# Patient Record
Sex: FEMALE | Race: ASIAN | Employment: FULL TIME | URBAN - METROPOLITAN AREA
[De-identification: names, ages, dates, MRNs, and addresses within clinical notes are randomized per-mention and may not be internally consistent; named-entity substitution may affect disease eponyms.]

---

## 2021-08-24 ENCOUNTER — OFFICE VISIT (OUTPATIENT)
Dept: URGENT CARE | Facility: URGENT CARE | Age: 26
End: 2021-08-24
Payer: COMMERCIAL

## 2021-08-24 VITALS
SYSTOLIC BLOOD PRESSURE: 133 MMHG | WEIGHT: 148 LBS | TEMPERATURE: 98.9 F | HEART RATE: 98 BPM | RESPIRATION RATE: 20 BRPM | DIASTOLIC BLOOD PRESSURE: 86 MMHG | OXYGEN SATURATION: 98 %

## 2021-08-24 DIAGNOSIS — S01.01XA LACERATION OF SCALP, INITIAL ENCOUNTER: Primary | ICD-10-CM

## 2021-08-24 PROCEDURE — 12001 RPR S/N/AX/GEN/TRNK 2.5CM/<: CPT | Performed by: NURSE PRACTITIONER

## 2021-08-24 ASSESSMENT — ENCOUNTER SYMPTOMS
DIARRHEA: 0
ROS SKIN COMMENTS: SCALP LACERATION
COUGH: 0
FEVER: 0
SHORTNESS OF BREATH: 0
HEADACHES: 0
SORE THROAT: 0
NAUSEA: 0
RHINORRHEA: 0
VOMITING: 0
CHILLS: 0
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
## 2021-08-24 NOTE — PATIENT INSTRUCTIONS
Patient Education     Laceration, Skin Adhesive  A laceration is a cut through the skin. You have a laceration that your healthcare provider has closed with skin adhesive, a type of skin glue.  Home care  You may take over-the-counter medicine such as acetaminophen, naproxen, or ibuprofen for pain, unless your provider prescribed another pain medicine. Talk with your healthcare provider before using these medicines if you have chronic liver or kidney disease or ever had a stomach ulcer or gastrointestinal bleeding.  General care    Keep the wound clean and dry. You may shower or bathe as usual, but don't use soaps, lotions, or ointments on the wound area. Don't scrub the wound. After bathing, pat the wound dry with a soft towel.    Don't scratch, rub, or pick at the adhesive film. Don't place tape directly over the film.    Don't apply liquids such as peroxide, ointments, or creams to the wound while the film is in place. These may dissolve the adhesive too soon.    Most skin wounds heal without problems. However, an infection sometimes occurs despite correct treatment. Watch for the signs of infection listed below.    Follow-up care  Follow up with your healthcare provider, or as advised. The adhesive film or skin glue usually falls off in 5 to 10 days.  When to seek medical advice  Call your healthcare provider right away if any of these occur:    Signs of infection:  ? Fever of 100.4 F (38 C) or higher, or as advised by your healthcare provider  ? Increasing pain in the wound  ? Increasing redness or swelling  ? Pus coming from the wound    Wound bleeds more than a small amount or bleeding doesn t stop    Glue comes off earlier than expected and the wound edges come apart    You feel numbness or weakness in the wound area that doesn t go away  Polo last reviewed this educational content on 8/1/2019 2000-2021 [employer]. All rights reserved. This information is not intended as a substitute  for professional medical care. Always follow your healthcare professional's instructions.

## 2022-11-28 ENCOUNTER — TRANSFERRED RECORDS (OUTPATIENT)
Dept: HEALTH INFORMATION MANAGEMENT | Facility: CLINIC | Age: 27
End: 2022-11-28

## 2022-11-28 LAB
ALT SERPL-CCNC: 32 IU/L (ref 0–32)
AST SERPL-CCNC: 25 IU/L (ref 0–40)
CHOLESTEROL (EXTERNAL): 185 MG/DL (ref 100–199)
CREATININE (EXTERNAL): 0.52 MG/DL (ref 0.57–1)
GFR ESTIMATED (EXTERNAL): 131 ML/MIN/1.73M2
GLUCOSE (EXTERNAL): 90 MG/DL (ref 70–99)
HBA1C MFR BLD: 5.3 % (ref 4.8–5.6)
HDLC SERPL-MCNC: 46 MG/DL
LDL CHOLESTEROL (EXTERNAL): 115 MG/DL (ref 0–99)
POTASSIUM (EXTERNAL): 4.3 MMOL/L (ref 3.5–5.2)
TRIGLYCERIDES (EXTERNAL): 132 MG/DL (ref 0–149)
TSH SERPL-ACNC: 2.32 UIU/ML (ref 0.45–4.5)

## 2022-12-06 ENCOUNTER — TRANSFERRED RECORDS (OUTPATIENT)
Dept: HEALTH INFORMATION MANAGEMENT | Facility: CLINIC | Age: 27
End: 2022-12-06

## 2022-12-06 ENCOUNTER — MEDICAL CORRESPONDENCE (OUTPATIENT)
Dept: HEALTH INFORMATION MANAGEMENT | Facility: CLINIC | Age: 27
End: 2022-12-06

## 2022-12-07 ENCOUNTER — TRANSCRIBE ORDERS (OUTPATIENT)
Dept: OTHER | Age: 27
End: 2022-12-07

## 2022-12-07 DIAGNOSIS — Z31.84 ENCOUNTER FOR FERTILITY PRESERVATION PROCEDURE: Primary | ICD-10-CM

## 2023-01-04 ENCOUNTER — LAB (OUTPATIENT)
Dept: LAB | Facility: CLINIC | Age: 28
End: 2023-01-04
Attending: PSYCHOLOGIST
Payer: COMMERCIAL

## 2023-01-04 ENCOUNTER — OFFICE VISIT (OUTPATIENT)
Dept: OBGYN | Facility: CLINIC | Age: 28
End: 2023-01-04
Attending: PSYCHOLOGIST
Payer: COMMERCIAL

## 2023-01-04 VITALS — WEIGHT: 154.8 LBS | HEART RATE: 84 BPM | SYSTOLIC BLOOD PRESSURE: 125 MMHG | DIASTOLIC BLOOD PRESSURE: 86 MMHG

## 2023-01-04 DIAGNOSIS — N97.9 FEMALE INFERTILITY: ICD-10-CM

## 2023-01-04 DIAGNOSIS — N97.9 FEMALE INFERTILITY: Primary | ICD-10-CM

## 2023-01-04 LAB
ESTRADIOL SERPL-MCNC: 28 PG/ML
FSH SERPL-ACNC: 5.4 IU/L
MIS SERPL-MCNC: 4.67 NG/ML (ref 0.89–9.9)
PROGEST SERPL-MCNC: 0.4 NG/ML

## 2023-01-04 PROCEDURE — 82670 ASSAY OF TOTAL ESTRADIOL: CPT

## 2023-01-04 PROCEDURE — 82627 DEHYDROEPIANDROSTERONE: CPT

## 2023-01-04 PROCEDURE — G0463 HOSPITAL OUTPT CLINIC VISIT: HCPCS | Performed by: STUDENT IN AN ORGANIZED HEALTH CARE EDUCATION/TRAINING PROGRAM

## 2023-01-04 PROCEDURE — 84403 ASSAY OF TOTAL TESTOSTERONE: CPT

## 2023-01-04 PROCEDURE — 99203 OFFICE O/P NEW LOW 30 MIN: CPT | Mod: GE | Performed by: OBSTETRICS & GYNECOLOGY

## 2023-01-04 PROCEDURE — 83520 IMMUNOASSAY QUANT NOS NONAB: CPT

## 2023-01-04 PROCEDURE — 36415 COLL VENOUS BLD VENIPUNCTURE: CPT

## 2023-01-04 PROCEDURE — 84144 ASSAY OF PROGESTERONE: CPT

## 2023-01-04 PROCEDURE — 83001 ASSAY OF GONADOTROPIN (FSH): CPT

## 2023-01-04 ASSESSMENT — ANXIETY QUESTIONNAIRES
2. NOT BEING ABLE TO STOP OR CONTROL WORRYING: NOT AT ALL
GAD7 TOTAL SCORE: 0
5. BEING SO RESTLESS THAT IT IS HARD TO SIT STILL: NOT AT ALL
1. FEELING NERVOUS, ANXIOUS, OR ON EDGE: NOT AT ALL
GAD7 TOTAL SCORE: 0
3. WORRYING TOO MUCH ABOUT DIFFERENT THINGS: NOT AT ALL
6. BECOMING EASILY ANNOYED OR IRRITABLE: NOT AT ALL
7. FEELING AFRAID AS IF SOMETHING AWFUL MIGHT HAPPEN: NOT AT ALL

## 2023-01-04 ASSESSMENT — PATIENT HEALTH QUESTIONNAIRE - PHQ9
5. POOR APPETITE OR OVEREATING: NOT AT ALL
SUM OF ALL RESPONSES TO PHQ QUESTIONS 1-9: 4

## 2023-01-04 NOTE — PROGRESS NOTES
[facility]  Gynecology Visit      HPI:    Kaleigh Cannon is a 27 year old G0, here for evaluation of infertility. She presents with her partner, Fabrice.    Patient states she and her  have been trying to get pregnant for the past 2 years. She states they have been having intercourse three times per week. They have never used ovulation test kits. Patient states she has very irregular periods, she gets periods every 6 months or so, last maybe 2 months ago. When she gets periods, they are heavy. She states her periods have been like this also for the past 2 years. Her periods prior to that occurred approximately ever 2-3 months. Her periods are not painful. She was evaluated in the past for PCOS, but does not remember specific labs and has never had a transvaginal ultrasound performed. She denies any pregnancies. Her  has no children. She denies any concerns for STIs, has never had an STI. She has never used birth control in the past.     OBHx  OB History    Para Term  AB Living   0 0 0 0 0 0   SAB IAB Ectopic Multiple Live Births   0 0 0 0 0       Past Medical History:   Diagnosis Date     Known health problems: none        Past Surgical History:   Procedure Laterality Date     NO HISTORY OF SURGERY         No current outpatient medications on file.     No current facility-administered medications for this visit.        No Known Allergies      Social History     Tobacco Use     Smoking status: Never     Smokeless tobacco: Never   Substance Use Topics     Alcohol use: Never     Drug use: Never         No family history on file.      ROS: 10-Point ROS negative except as noted in HPI    Physical Exam  /86   Pulse 84   Wt 70.2 kg (154 lb 12.8 oz)   LMP 04/10/2022   Gen: Well-appearing, NAD  HEENT: Normocephalic, atraumatic  CV:  Well perfused  Pulm: On room air, no increased work of breathing  Abd: Soft, non-tender, non-distended  Ext: No LE edema, extremities warm    Pelvic exam:  Deferred    Assessment/Plan:  [facility] [place] Cannon is a 27 year old G0 female here for evaluation of infertility with concern for PCOS    #Possible PCOS  #Infertility  - Since patient has not had lab or imaging evaluation for PCOS with history concerning for PCOS, will order this workup today. Discussed possible outcomes of evaluating for PCOS and infertility which may include the ability to give ovulation medications from this office, or fertility may require GUERDA management. Will observe results and discuss future options.   - Ordered random labs to be collected today, patient has not had a period in at least 2 months. E2, FSH, AMH, DHEA-S, testosterone, progesterone.   - TVUS  - Will complete pelvic exam at follow up visit  - Will have in person follow up visit after ultrasound to discuss next steps.     Staffed with .      Hardy Parrish MD  Obstetrics, Gynecology, and Women's Health  PGY3  7:41 PM 01/04/2023    The Patient was seen in [facility] by HARDY PARRISH.  I reviewed the history & exam. Assessment and plan were jointly made.    Griselda Cassidy MD

## 2023-01-05 LAB
DHEA-S SERPL-MCNC: 426 UG/DL (ref 35–430)
TESTOST SERPL-MCNC: 47 NG/DL (ref 8–60)

## 2023-05-20 ENCOUNTER — HEALTH MAINTENANCE LETTER (OUTPATIENT)
Age: 28
End: 2023-05-20

## 2024-07-02 ENCOUNTER — MEDICAL CORRESPONDENCE (OUTPATIENT)
Dept: HEALTH INFORMATION MANAGEMENT | Facility: CLINIC | Age: 29
End: 2024-07-02
Payer: MEDICAID

## 2024-07-27 ENCOUNTER — HEALTH MAINTENANCE LETTER (OUTPATIENT)
Age: 29
End: 2024-07-27

## 2024-08-20 NOTE — PATIENT INSTRUCTIONS
If you have labs or imaging done, the results will automatically release in [employer] without an interpretation.  Your health care professional will review those results and send an interpretation with recommendations as soon as possible, but this may be 1-3 business days.    If you have any questions regarding your visit, please contact your care team.     [employer] [facility]: [phone]  [facility] CLINIC HOURS TELEPHONE NUMBER   Todd BALTAZAR Chadd .      Amber-GALINA Arias-GALINA Greene-Surgery Scheduler  Jolie-         Monday-[place]  8:00 am-4:00 pm  [facility]  [facility]4:00 pm  Wednesday-[place] 8:00 am-4:00 pm  Thursday-[place] 8:00 am-4:00 pm    [facility]ical [facility]  [address] [place] [address]  PH: [phone] Fax    Imaging Scheduling all locations  PH: [phone]     [facility]  [address]  [place], [place] [address] [phone]    [facility]  [address] [place] [address]  PH: [phone]     **Surgeries** Our Surgery Schedulers will contact you to schedule. If you do not receive a call within 3 business days, please call [phone].  Urgent Care locations:  [facility]       Monday-Friday   10 am - 8 pm  Saturday and Sunday   9 am - 5 pm   [phone]   If you need a medication refill, please contact your pharmacy. Please allow 3 business days for your refill to be completed.  As always, Thank you for trusting us with your healthcare needs!

## 2024-08-21 ENCOUNTER — OFFICE VISIT (OUTPATIENT)
Dept: OBGYN | Facility: CLINIC | Age: 29
End: 2024-08-21
Payer: MEDICAID

## 2024-08-21 VITALS
OXYGEN SATURATION: 97 % | HEART RATE: 104 BPM | WEIGHT: 149.6 LBS | DIASTOLIC BLOOD PRESSURE: 85 MMHG | SYSTOLIC BLOOD PRESSURE: 133 MMHG

## 2024-08-21 DIAGNOSIS — N93.9 ABNORMAL UTERINE BLEEDING (AUB): ICD-10-CM

## 2024-08-21 DIAGNOSIS — Z12.4 CERVICAL CANCER SCREENING: ICD-10-CM

## 2024-08-21 DIAGNOSIS — D25.9 UTERINE LEIOMYOMA, UNSPECIFIED LOCATION: ICD-10-CM

## 2024-08-21 DIAGNOSIS — N89.8 VAGINAL DISCHARGE: Primary | ICD-10-CM

## 2024-08-21 LAB
CLUE CELLS: ABNORMAL
ERYTHROCYTE [DISTWIDTH] IN BLOOD BY AUTOMATED COUNT: 11.9 % (ref 10–15)
HCT VFR BLD AUTO: 44.1 % (ref 35–47)
HGB BLD-MCNC: 14.5 G/DL (ref 11.7–15.7)
MCH RBC QN AUTO: 28 PG (ref 26.5–33)
MCHC RBC AUTO-ENTMCNC: 32.9 G/DL (ref 31.5–36.5)
MCV RBC AUTO: 85 FL (ref 78–100)
PLATELET # BLD AUTO: 278 10E3/UL (ref 150–450)
PROLACTIN SERPL 3RD IS-MCNC: 23 NG/ML (ref 5–23)
RBC # BLD AUTO: 5.18 10E6/UL (ref 3.8–5.2)
TRICHOMONAS, WET PREP: ABNORMAL
TSH SERPL DL<=0.005 MIU/L-ACNC: 2.22 UIU/ML (ref 0.3–4.2)
WBC # BLD AUTO: 6.3 10E3/UL (ref 4–11)
WBC'S/HIGH POWER FIELD, WET PREP: ABNORMAL
YEAST, WET PREP: ABNORMAL

## 2024-08-21 PROCEDURE — G0145 SCR C/V CYTO,THINLAYER,RESCR: HCPCS | Performed by: GENERAL PRACTICE

## 2024-08-21 PROCEDURE — 84443 ASSAY THYROID STIM HORMONE: CPT | Performed by: GENERAL PRACTICE

## 2024-08-21 PROCEDURE — 36415 COLL VENOUS BLD VENIPUNCTURE: CPT | Performed by: GENERAL PRACTICE

## 2024-08-21 PROCEDURE — 87210 SMEAR WET MOUNT SALINE/INK: CPT | Performed by: GENERAL PRACTICE

## 2024-08-21 PROCEDURE — 84146 ASSAY OF PROLACTIN: CPT | Performed by: GENERAL PRACTICE

## 2024-08-21 PROCEDURE — 87591 N.GONORRHOEAE DNA AMP PROB: CPT | Performed by: GENERAL PRACTICE

## 2024-08-21 PROCEDURE — 87624 HPV HI-RISK TYP POOLED RSLT: CPT | Performed by: GENERAL PRACTICE

## 2024-08-21 PROCEDURE — 99459 PELVIC EXAMINATION: CPT | Performed by: GENERAL PRACTICE

## 2024-08-21 PROCEDURE — T1013 SIGN LANG/ORAL INTERPRETER: HCPCS | Mod: U4 | Performed by: GENERAL PRACTICE

## 2024-08-21 PROCEDURE — 87491 CHLMYD TRACH DNA AMP PROBE: CPT | Performed by: GENERAL PRACTICE

## 2024-08-21 PROCEDURE — 85027 COMPLETE CBC AUTOMATED: CPT | Performed by: GENERAL PRACTICE

## 2024-08-21 PROCEDURE — 99214 OFFICE O/P EST MOD 30 MIN: CPT | Performed by: GENERAL PRACTICE

## 2024-08-21 PROCEDURE — G0124 SCREEN C/V THIN LAYER BY MD: HCPCS | Performed by: PATHOLOGY

## 2024-08-21 RX ORDER — FAMOTIDINE 20 MG
TABLET ORAL
COMMUNITY

## 2024-08-21 NOTE — PROGRESS NOTES
"HPI:   Kaleigh is a 28 year old  here for abnormal vaginal discharge. States discharge is stringy, somewhat red. Started about 2-3 months ago. Discharge comes and goes. Not associated menstrual cycle or intercourse. Denies vaginal burning. Has some vaginal itching. Also was back in [place] in 2024 and told she had fibroids. Her menses are very irregular every 1-3 months. Lasting up to 5+ days with several heavy days.      Also patient states she tested positive for HPV and was told to see a GYN doctor. No records of this test or clinic visit available at this time and patient does not have records.       She is currently using nothing for birth control.  Not currently sexually active.       ROS:   Weight loss or gain: denies  Abdominal bloating / pain: denies  Appetite: normal  Energy: normal  Nausea / vomiting: denies  Fevers / chills / night sweats: denies  SOB / cough: denies  Chest pain / palpitations: denies  Diarrhea / constipation: denies  Bloody / tar-colored stools: denies  Urinary frequency / urgency / blood: denies  Headaches / vision changes: denies  Mouth sores: denies  Skin changes / rashes: denies      GYNHx:   Patient's last menstrual period was 2024.  Cycles: as above  Last paps:  No results found for: \"PAP\"      OBHx:  OB History    Para Term  AB Living   0 0 0 0 0 0   SAB IAB Ectopic Multiple Live Births   0 0 0 0 0        PSH:   Past Surgical History:   Procedure Laterality Date    NO HISTORY OF SURGERY         PMH:  Past Medical History:   Diagnosis Date    Known health problems: none         MEDs   Current Outpatient Medications   Medication Sig Dispense Refill    Vitamin D, Cholecalciferol, 25 MCG (1000 UT) CAPS Take by mouth. Unsure daily mg dose       No current facility-administered medications for this visit.       Allergies:  No Known Allergies    FamHx:  No family history on file.    SocHx:  Social History     Socioeconomic History    Marital status:  "     Spouse name: Not on file    Number of children: Not on file    Years of education: Not on file    Highest education level: Not on file   Occupational History    Not on file   Tobacco Use    Smoking status: Never    Smokeless tobacco: Never   Vaping Use    Vaping status: Never Used   Substance and Sexual Activity    Alcohol use: Never    Drug use: Never    Sexual activity: Yes     Partners: Male   Other Topics Concern    Not on file   Social History Narrative    Not on file     Social Determinants of Health     Financial Resource Strain: Not on file   Food Insecurity: Not on file   Transportation Needs: Not on file   Physical Activity: Not on file   Stress: Not on file   Social Connections: Not on file   Interpersonal Safety: Not on file   Housing Stability: Not on file             PE:   /85   Pulse 104   Wt 67.9 kg (149 lb 9.6 oz)   LMP 08/09/2024   SpO2 97%   There is no height or weight on file to calculate BMI.    General Appearance:  healthy, alert, active, no distress  HEENT: NC/AT, supple, trachea midline, no goiter  CV: well perfused  Lungs: non-labored breathing  Breast: not performed  Neuro: normal gait, balance  Skin: no lesions, visible rashes/bruising  Psych: appropriate mood/affect  Abdomen: Benign, No masses, organomegaly, and Soft, non-tender.   Pelvic:       - Ext: Vulva and perineum are normal without lesion, mass or discharge        - Urethra: normal without discharge or scarring        - Bladder: No tenderness,       - Vagina: Normal vaginal mucosa with no masses or lesions.  White stringy non-malodorous discharge noted. wet prep collected       - Cervix: Normal appearing cervix with no masses or lesions.  GC/CT, Pap and HPV collected       - Uterus:Normal shape, position and consistency       - Adnexa: Normal without masses or tenderness       - Rectal: deferred      RADS  None for review       LABS  None for review         A/P: 28-year-old G0 female here for vaginal discharge.   Review of systems also notable for abnormal uterine bleeding and fibroid uterus.  Patient reports positive HPV however no records are available and she is unable to tell us through  were her testing was done.  She has also had some testing done in [place] and those records are also not available at this time.  Exam today was overall unremarkable other than mildly friable cervix.  Gonorrhea, chlamydia, wet prep and Pap/HPV were collected.  Recommend patient have pelvic ultrasound completed and assistance was given and scheduling this.  Will have patient follow-up in clinic in 2 to 3 weeks after ultrasound is resulted.  Also will obtain TSH, prolactin and CBC due to her abnormal bleeding.  Will discuss further management options for AUB but hormonal control is likely best option as she is not currently trying to become pregnant.  All questions answered.  Patient agreement plan of care.  A Egyptian  was used for the entire duration of this visit.    ICD-10-CM    1. Vaginal discharge  N89.8 Wet prep - Clinic Collect     Chlamydia & Gonorrhea by PCR, [facility] - Clinic Collect      2. Cervical cancer screening  Z12.4 Pap Screen Reflex to HPV if ASCUS - Recommended Age 25 - 29 Years      3. Abnormal uterine bleeding (AUB)  N93.9 Chlamydia & Gonorrhea by PCR, [facility] - Clinic Collect     US Pelvic Complete with Transvaginal                35 min spent on the date of the encounter in chart review, patient visit, review of tests, documentation and/or discussion with other providers about the issues documented above.     Todd Florentino DO, FACOG

## 2024-08-22 LAB
C TRACH DNA SPEC QL PROBE+SIG AMP: NEGATIVE
N GONORRHOEA DNA SPEC QL NAA+PROBE: NEGATIVE

## 2024-08-27 ENCOUNTER — ANCILLARY PROCEDURE (OUTPATIENT)
Dept: ULTRASOUND IMAGING | Facility: CLINIC | Age: 29
End: 2024-08-27
Attending: GENERAL PRACTICE
Payer: MEDICAID

## 2024-08-27 DIAGNOSIS — N93.9 ABNORMAL UTERINE BLEEDING (AUB): ICD-10-CM

## 2024-08-27 PROCEDURE — 76830 TRANSVAGINAL US NON-OB: CPT | Mod: TC | Performed by: RADIOLOGY

## 2024-08-27 PROCEDURE — 76856 US EXAM PELVIC COMPLETE: CPT | Mod: TC | Performed by: RADIOLOGY

## 2024-08-28 LAB
BKR LAB AP GYN ADEQUACY: ABNORMAL
BKR LAB AP GYN INTERPRETATION: ABNORMAL
BKR LAB AP HPV REFLEX: ABNORMAL
BKR LAB AP PREVIOUS ABNL DX: ABNORMAL
BKR LAB AP PREVIOUS ABNORMAL: ABNORMAL
PATH REPORT.COMMENTS IMP SPEC: ABNORMAL
PATH REPORT.COMMENTS IMP SPEC: ABNORMAL
PATH REPORT.RELEVANT HX SPEC: ABNORMAL

## 2024-08-29 PROBLEM — R87.810 ASCUS WITH POSITIVE HIGH RISK HPV CERVICAL: Status: ACTIVE | Noted: 2024-08-21

## 2024-08-29 PROBLEM — R87.610 ASCUS WITH POSITIVE HIGH RISK HPV CERVICAL: Status: ACTIVE | Noted: 2024-08-21

## 2024-08-29 LAB
HPV HR 12 DNA CVX QL NAA+PROBE: POSITIVE
HPV16 DNA CVX QL NAA+PROBE: NEGATIVE
HPV18 DNA CVX QL NAA+PROBE: NEGATIVE
HUMAN PAPILLOMA VIRUS FINAL DIAGNOSIS: ABNORMAL

## 2024-08-30 ENCOUNTER — PATIENT OUTREACH (OUTPATIENT)
Dept: FAMILY MEDICINE | Facility: CLINIC | Age: 29
End: 2024-08-30
Payer: MEDICAID

## 2024-09-17 NOTE — PROGRESS NOTES
*Entire visit conducted with [employer] services.      28 year old  presents for colposcopy.    Indication for procedure: ASCUS / HPV+ HR other  Prior history of cervical dysplasia: No  Prior Colposcopy history: No  Prior LEEP:No    Patient's last menstrual period was 2024.    Tobacco: No  Gardasil vaccination status: Yes  Pregnancy test: Negative  She denies the possibility of pregnancy     Discussed nature of HPV related infection, natural history and association with cervical dysplasia.  Procedure for colposcopy and biopsy was explained to the patient and consent obtained.  All the patient's questions were answered.    PROCEDURE:  COLPOSCOPY  After a procedural timeout was taken, she was positioned in dorsal lithotomy and a speculum was inserted to allow visualization of the cervix. A 5% acetic acid solution was applied to the ectocervix with large swabs. Lugols solution was also applied.  Colposcopic examination was then undertaken as noted below.    FINDINGS:  Physical Exam  BP (!) 131/94   Pulse 95   LMP 2024   SpO2 98%   WDWN, NAD  Non labored breathing  Well perfused    Procedures  Cervix: no visible lesions, no mosaicism, no punctation, and no abnormal vasculature; acetowhite changes noted circumferentially around transition zone.  Cervical biopsies taken at 6 and 12:00.  ECC was performed.  Hemostasis achieved with silver nitrate.  .    Vaginal inspection: vaginal colposcopy not performed and normal without visible lesions.    Vulvar colposcopy: vulvar colposcopy not performed and normal mucosa without lesions.    Procedure Summary: Patient tolerated procedure well.    Colposcopic Impression: Normal-CIN1        I conclusion of procedure, patient desired to discuss ultrasound results as well as menstrual cycles.  She recently had an ultrasound with noting 3 fibroids largest of which is 5 cm SUBSEROSAL.  She is having cycles every 1 to 4 months and desired pregnancy.  She  denies significantly heavy or painful periods.  I discussed with patient that I would recommend she schedule follow-up visit to discuss fertility.  All questions answered.  Patient agreement with plan of care.    15 minutes were spent in face to face discussion regarding her abnormal pap, and fertility concerns separate from the procedure.     Plan: Specimens labelled and sent to pathology. and Will base further treatment on pathology findings.  Return to clinic for infertility consultation.      Todd Florentino DO, FACOG

## 2024-09-17 NOTE — PATIENT INSTRUCTIONS
If you have labs or imaging done, the results will automatically release in [employer] without an interpretation.  Your health care professional will review those results and send an interpretation with recommendations as soon as possible, but this may be 1-3 business days.    If you have any questions regarding your visit, please contact your care team.     [employer] [facility]: [phone]  [facility] CLINIC HOURS TELEPHONE NUMBER   Todd BALTAZAR Chadd .      Amber-GALINA Arias-GALINA Greene-Surgery Scheduler  Jolie-         Monday-[place]  8:00 am-4:00 pm  [facility]00 [facility]  Wednesday-[place] 8:00 am-4:00 pm  Thursday-[place] 8:00 am-4:00 pm    Typical Surgery Day Friday [facility]  [address] [place] [address]  PH: [phone] Fax    Imaging Scheduling all locations  PH: [phone]     [facility] Labor and Delivery  [address]  [place], [place] [address] [phone]    [facility]  [address] [place] [address]  PH: [phone]     **Surgeries** Our Surgery Schedulers will contact you to schedule. If you do not receive a call within 3 business days, please call [phone].  Urgent Care locations:  [facility]       Monday-Friday   10 am - 8 pm  Saturday and Sunday   9 am - 5 pm   [phone]   If you need a medication refill, please contact your pharmacy. Please allow 3 business days for your refill to be completed.  As always, Thank you for trusting us with your healthcare needs!

## 2024-09-18 ENCOUNTER — OFFICE VISIT (OUTPATIENT)
Dept: OBGYN | Facility: CLINIC | Age: 29
End: 2024-09-18
Payer: MEDICAID

## 2024-09-18 VITALS — SYSTOLIC BLOOD PRESSURE: 131 MMHG | HEART RATE: 95 BPM | DIASTOLIC BLOOD PRESSURE: 94 MMHG | OXYGEN SATURATION: 98 %

## 2024-09-18 DIAGNOSIS — R87.610 ASCUS WITH POSITIVE HIGH RISK HPV CERVICAL: Primary | ICD-10-CM

## 2024-09-18 DIAGNOSIS — Z32.00 ENCOUNTER FOR PREGNANCY TEST, RESULT UNKNOWN: ICD-10-CM

## 2024-09-18 DIAGNOSIS — N92.6 IRREGULAR MENSTRUAL CYCLE: ICD-10-CM

## 2024-09-18 DIAGNOSIS — R87.810 ASCUS WITH POSITIVE HIGH RISK HPV CERVICAL: Primary | ICD-10-CM

## 2024-09-18 DIAGNOSIS — N97.9 FEMALE INFERTILITY: ICD-10-CM

## 2024-09-18 DIAGNOSIS — D25.2 SUBSEROUS LEIOMYOMA OF UTERUS: ICD-10-CM

## 2024-09-18 LAB — HCG UR QL: NEGATIVE

## 2024-09-18 PROCEDURE — 57454 BX/CURETT OF CERVIX W/SCOPE: CPT | Performed by: GENERAL PRACTICE

## 2024-09-18 PROCEDURE — 88305 TISSUE EXAM BY PATHOLOGIST: CPT | Performed by: STUDENT IN AN ORGANIZED HEALTH CARE EDUCATION/TRAINING PROGRAM

## 2024-09-18 PROCEDURE — 81025 URINE PREGNANCY TEST: CPT | Performed by: GENERAL PRACTICE

## 2024-09-18 PROCEDURE — 99212 OFFICE O/P EST SF 10 MIN: CPT | Mod: 25 | Performed by: GENERAL PRACTICE

## 2024-09-18 RX ORDER — AMOXICILLIN 500 MG
1200 CAPSULE ORAL DAILY
COMMUNITY

## 2024-09-20 LAB
PATH REPORT.COMMENTS IMP SPEC: NORMAL
PATH REPORT.COMMENTS IMP SPEC: NORMAL
PATH REPORT.FINAL DX SPEC: NORMAL
PATH REPORT.GROSS SPEC: NORMAL
PATH REPORT.MICROSCOPIC SPEC OTHER STN: NORMAL
PATH REPORT.RELEVANT HX SPEC: NORMAL
PHOTO IMAGE: NORMAL

## 2024-10-08 NOTE — PATIENT INSTRUCTIONS
If you have labs or imaging done, the results will automatically release in [employer] without an interpretation.  Your health care professional will review those results and send an interpretation with recommendations as soon as possible, but this may be 1-3 business days.    If you have any questions regarding your visit, please contact your care team.     [employer] [facility]: [phone]  [facility] CLINIC HOURS TELEPHONE NUMBER   Todd BALTAZAR Chadd .      Amber-GALINA Arias-GALINA Greene-Surgery Scheduler  Jolie-         Monday-[place]  8:00 am-4:00 pm  [facility]  [facility] am-4:00 pm  Wednesday-[place] 8:00 am-4:00 pm  Thursday-[place] 8:00 am-4:00 pm    Typical Surgery Day Friday [facility]  [address] [place] [address]  PH: [phone] Fax    Imaging Scheduling all locations  PH: [phone]     [facility] Labor [facility] Delivery  [address]  [place], [place] [address] [phone]    [facility]  [address] [place] [address]  PH: [phone]     **Surgeries** Our Surgery Schedulers will contact you to schedule. If you do not receive a call within 3 business days, please call [phone].  Urgent Care locations:  [facility]       Monday-Friday   10 am - 8 pm  Saturday and Sunday   9 am - 5 pm   [phone]   If you need a medication refill, please contact your pharmacy. Please allow 3 business days for your refill to be completed.  As always, Thank you for trusting us with your healthcare needs!

## 2024-10-09 ENCOUNTER — OFFICE VISIT (OUTPATIENT)
Dept: OBGYN | Facility: CLINIC | Age: 29
End: 2024-10-09
Payer: COMMERCIAL

## 2024-10-09 VITALS — DIASTOLIC BLOOD PRESSURE: 85 MMHG | WEIGHT: 150.6 LBS | HEART RATE: 88 BPM | SYSTOLIC BLOOD PRESSURE: 128 MMHG

## 2024-10-09 DIAGNOSIS — N87.0 CERVICAL DYSPLASIA, MILD: Primary | ICD-10-CM

## 2024-10-09 PROCEDURE — 99213 OFFICE O/P EST LOW 20 MIN: CPT | Performed by: GENERAL PRACTICE

## 2024-10-09 PROCEDURE — T1013 SIGN LANG/ORAL INTERPRETER: HCPCS | Mod: U3

## 2024-10-09 NOTE — PROGRESS NOTES
*Hungarian  used for entire visit*    HPI:   Kaleigh is a 28 year old  here for colpo follow up.  She wanted to further discuss her pathology results and plan of care.  Of note she also notes that she has had some increased whitish discharge over the last couple months follow-up.  Wet prep in 2024 was normal.  When she has this discharge she denies having any vaginal itching, burning, discomfort.  No other issues or concerns at this time.        ROS:   10 point review of systems negative except for above.      GYNHx:   Patient's last menstrual period was 10/06/2024.    OBHx:  OB History    Para Term  AB Living   0 0 0 0 0 0   SAB IAB Ectopic Multiple Live Births   0 0 0 0 0        PSH:   Past Surgical History:   Procedure Laterality Date    NO HISTORY OF SURGERY         PMH:  Past Medical History:   Diagnosis Date    Known health problems: none         MEDs   Current Outpatient Medications   Medication Sig Dispense Refill    NONFORMULARY Vitamin K      Omega-3 Fatty Acids (FISH OIL) 1200 MG capsule Take 1,200 mg by mouth daily.      Vitamin D, Cholecalciferol, 25 MCG (1000 UT) CAPS Take by mouth. Unsure daily mg dose       No current facility-administered medications for this visit.       Allergies:  No Known Allergies    FamHx:  History reviewed. No pertinent family history.    SocHx:  Social History     Socioeconomic History    Marital status:      Spouse name: Not on file    Number of children: Not on file    Years of education: Not on file    Highest education level: Not on file   Occupational History    Not on file   Tobacco Use    Smoking status: Never    Smokeless tobacco: Never   Vaping Use    Vaping status: Never Used   Substance and Sexual Activity    Alcohol use: Never    Drug use: Never    Sexual activity: Yes     Partners: Male   Other Topics Concern    Not on file   Social History Narrative    Not on file     Social Determinants of Health     Financial  Resource Strain: Not on file   Food Insecurity: Not on file   Transportation Needs: Not on file   Physical Activity: Not on file   Stress: Not on file   Social Connections: Not on file   Interpersonal Safety: Not on file   Housing Stability: Not on file             PE:   /85   Pulse 88   Wt 68.3 kg (150 lb 9.6 oz)   LMP 10/06/2024   There is no height or weight on file to calculate BMI.    General Appearance:  healthy, alert, active, no distress  HEENT: NC/AT  CV: well perfused  Lungs: non-labored breathing      RADS  None for review       LABS  Final Diagnosis   A.  Cervix, 6:00, biopsy:  -Fragments of endocervical glands and metaplastic squamous mucosa with inflammation and reactive changes.     B.  Cervix, 12:00, biopsy:  -Squamous mucosa with focal low-grade squamous intraepithelial lesion (JOSE ANTONIO-1) in a background of metaplastic and reactive changes.  -Unremarkable endocervical glands with microglandular hyperplasia.     C.  Endocervix, curettage:  -Scant unremarkable endocervical glands in a background of acute inflammation and mucin.     Interpretation    Abnormal   Atypical squamous cells of undetermined significance (ASC-US)   Electronically signed by Tamica Colindres MD on 8/28/2024 at  8:19 AM                           A/P: 28-year-old female with JOSE ANTONIO-1 after colposcopy for ASCUS and HPV positive high risk other.  We reviewed her diagnosis of JOSE ANTONIO-1.  Reviewed the pathophysiology of HPV related disease as well as the progression of JOSE ANTONIO 1 to 2 to 3.  Discussed that she does not have cancer of her cervix.  Recommend continued surveillance appointment significant progression of cervical disease.  Would recommend that she return to clinic in 1 year for repeat Pap and HPV testing.  All questions answered.  Patient agreement plan of care.    ICD-10-CM    1. Cervical dysplasia, mild  N87.0           25 min spent on the date of the encounter in chart review, patient visit, review of tests, documentation and/or  discussion with other providers about the issues documented above.     Todd Florentino DO, FACOG

## 2024-10-15 ENCOUNTER — PATIENT OUTREACH (OUTPATIENT)
Dept: OBGYN | Facility: CLINIC | Age: 29
End: 2024-10-15
Payer: COMMERCIAL

## 2024-10-18 ENCOUNTER — VIRTUAL VISIT (OUTPATIENT)
Dept: INTERPRETER SERVICES | Facility: CLINIC | Age: 29
End: 2024-10-18
Payer: COMMERCIAL

## 2024-10-30 ENCOUNTER — OFFICE VISIT (OUTPATIENT)
Dept: FAMILY MEDICINE | Facility: CLINIC | Age: 29
End: 2024-10-30
Payer: COMMERCIAL

## 2024-10-30 VITALS
DIASTOLIC BLOOD PRESSURE: 75 MMHG | WEIGHT: 152.2 LBS | OXYGEN SATURATION: 98 % | TEMPERATURE: 97.8 F | SYSTOLIC BLOOD PRESSURE: 114 MMHG | BODY MASS INDEX: 29.88 KG/M2 | HEIGHT: 60 IN | RESPIRATION RATE: 16 BRPM | HEART RATE: 77 BPM

## 2024-10-30 DIAGNOSIS — L30.9 DERMATITIS: Primary | ICD-10-CM

## 2024-10-30 PROCEDURE — 99203 OFFICE O/P NEW LOW 30 MIN: CPT | Performed by: NURSE PRACTITIONER

## 2024-10-30 PROCEDURE — G2211 COMPLEX E/M VISIT ADD ON: HCPCS | Performed by: NURSE PRACTITIONER

## 2024-10-30 RX ORDER — TRIAMCINOLONE ACETONIDE 1 MG/G
OINTMENT TOPICAL 2 TIMES DAILY
Qty: 453.6 G | Refills: 0 | Status: SHIPPED | OUTPATIENT
Start: 2024-10-30

## 2024-10-30 ASSESSMENT — PAIN SCALES - GENERAL: PAINLEVEL_OUTOF10: NO PAIN (0)

## 2024-10-30 NOTE — PROGRESS NOTES
Assessment & Plan     Dermatitis  Upper arms and trunk.   -Use Tide free or ALL dye free perfume free laundry detergent. Use Dove or Aveeno lotion or soap. Use Aquaphor, lubrider, cetaphil or eucerin lotion daily to two times a day as needed .   - RTC in 2-4 weeks if not improved   - triamcinolone (KENALOG) 0.1 % external ointment; Apply topically 2 times daily. Once resolved, can use ointment as needed    BMI  Estimated body mass index is 29.72 kg/m  as calculated from the following:    Height as of this encounter: 1.524 m (5').    Weight as of this encounter: 69 kg (152 lb 3.2 oz).             Subjective   Kaleigh is a 28 year old, presenting for the following health issues:  Acne      10/30/2024     6:58 AM   Additional Questions   Roomed by nichol   Accompanied by self         10/30/2024     6:58 AM   Patient Reported Additional Medications   Patient reports taking the following new medications none     History of Present Illness       Reason for visit:  Ance skin  Symptom onset:  More than a month  Symptom intensity:  Mild  Symptom progression:  Worsening  Had these symptoms before:  Yes  Has tried/received treatment for these symptoms:  No   She is taking medications regularly.    Patient states for the last few years there has been Acne or a rash on stomach and entire back.   Denies any drainage or pain.  Mild itchiness.   Patient has tried lotion and no change.               Review of Systems  Constitutional, HEENT, cardiovascular, pulmonary, gi and gu systems are negative, except as otherwise noted.      Objective    /75 (BP Location: Left arm, Patient Position: Sitting, Cuff Size: Adult Regular)   Pulse 77   Temp 97.8  F (36.6  C) (Temporal)   Resp 16   Ht 1.524 m (5')   Wt 69 kg (152 lb 3.2 oz)   LMP 10/06/2024   SpO2 98%   BMI 29.72 kg/m    Body mass index is 29.72 kg/m .  Physical Exam   GENERAL: alert and no distress  SKIN: rash to upper arms and trunk  PSYCH: mentation appears normal,  affect normal/bright        Signed Electronically by: DEV Meadows CNP

## 2025-07-10 ENCOUNTER — OFFICE VISIT (OUTPATIENT)
Dept: OBGYN | Facility: CLINIC | Age: 30
End: 2025-07-10
Payer: COMMERCIAL

## 2025-07-10 VITALS
BODY MASS INDEX: 28.35 KG/M2 | DIASTOLIC BLOOD PRESSURE: 86 MMHG | HEART RATE: 72 BPM | SYSTOLIC BLOOD PRESSURE: 131 MMHG | OXYGEN SATURATION: 100 % | WEIGHT: 155 LBS

## 2025-07-10 DIAGNOSIS — Z12.4 CERVICAL CANCER SCREENING: Primary | ICD-10-CM

## 2025-07-10 DIAGNOSIS — Z11.3 SCREENING EXAMINATION FOR VENEREAL DISEASE: ICD-10-CM

## 2025-07-10 NOTE — PROGRESS NOTES
Subjective:  Kaleigh is a 29 year old   is here today with use of telephone Lithuanian  with the following concerns:    Pap: history below. Repeating diagnostic today.  STI Testing: requests GC/CT. No known exposures or s/sx.      Pap History  24 ASCUS pap, + HR HPV (not 16 or 18). Plan: colposcopy bef 24 [place]: JOSE ANTONIO 1, ECC-neg. Plan: cotest in 1 year        ROS: Pertinent ROS as above.    Medical history  OB History    Para Term  AB Living   0 0 0 0 0 0   SAB IAB Ectopic Multiple Live Births   0 0 0 0 0      Past Medical History:   Diagnosis Date    Known health problems: none       Past Surgical History:   Procedure Laterality Date    NO HISTORY OF SURGERY       ALL/Meds: Her medication and allergy histories were reviewed and are documented in their appropriate chart areas.    SH: Reviewed and documented in the appropriate area of the chart.  FH:  Her family history is reviewed and updated in the chart, today.  PMH: Her past medical, surgical, and obstetric histories were reviewed and updated today in the appropriate chart areas.    Objective:  PE: /86 (BP Location: Left arm, Patient Position: Sitting, Cuff Size: Adult Regular)   Pulse 72   Wt 70.3 kg (155 lb)   LMP 2025 (Exact Date)   SpO2 100%   BMI 28.35 kg/m    Body mass index is 28.35 kg/m .    Pertinent Physical exam findings:    GENERAL: Active, alert, in no acute distress.  SKIN: Clear. No significant rash, abnormal pigmentation or lesions  MS: no gross musculoskeletal defects noted, no edema  PSYCH: Age-appropriate alertness and orientation    Pelvic:       - Ext: Vulva and perineum are normal without lesion, mass or discharge        - Urethra: normal without discharge or scarring        - Bladder: no tenderness, no masses       - Vagina: without discharge and rugated       - Cervix: normal, nulliparous, and friable with pap    A/P:  Kaleigh Cannon is a 29 year old  here today with  the following concerns:  (Z12.4) Cervical cancer screening  (primary encounter diagnosis)  Plan: HPV and Gynecologic Cytology Panel -         Recommended Age 30 - 65 Years          (Z11.3) Screening examination for venereal disease  Plan: Chlamydia trachomatis PCR, Neisseria         gonorrhoeae PCR          She is agreeable to the plan above and has no further questions or concerns. She did not request a chaperone for the physical exam component of the visit today.     DEV Cole CNP

## 2025-07-17 LAB
BKR AP ASSOCIATED HPV REPORT: ABNORMAL
BKR LAB AP GYN ADEQUACY: ABNORMAL
BKR LAB AP GYN INTERPRETATION: ABNORMAL
BKR LAB AP PREVIOUS ABNL DX: ABNORMAL
BKR LAB AP PREVIOUS ABNORMAL: ABNORMAL
PATH REPORT.COMMENTS IMP SPEC: ABNORMAL
PATH REPORT.COMMENTS IMP SPEC: ABNORMAL
PATH REPORT.RELEVANT HX SPEC: ABNORMAL

## 2025-07-18 PROBLEM — R87.810 ASCUS WITH POSITIVE HIGH RISK HPV CERVICAL: Status: ACTIVE | Noted: 2024-08-21

## 2025-07-18 PROBLEM — R87.610 ASCUS WITH POSITIVE HIGH RISK HPV CERVICAL: Status: ACTIVE | Noted: 2024-08-21

## 2025-08-10 ENCOUNTER — HEALTH MAINTENANCE LETTER (OUTPATIENT)
Age: 30
End: 2025-08-10